# Patient Record
Sex: MALE | Race: ASIAN | URBAN - METROPOLITAN AREA
[De-identification: names, ages, dates, MRNs, and addresses within clinical notes are randomized per-mention and may not be internally consistent; named-entity substitution may affect disease eponyms.]

---

## 2024-10-27 ENCOUNTER — TELEPHONE ENCOUNTER (OUTPATIENT)
Dept: URBAN - METROPOLITAN AREA CLINIC 86 | Facility: CLINIC | Age: 39
End: 2024-10-27

## 2024-10-27 NOTE — HPI-TODAY'S VISIT:
Patient is a 39-year-old being [place] [facility] by Dolores Linares nurse practitioner at fax #[phone] for evaluation of chronic hepatitis B. Patient is listed as having hepatitis B with a DNA level of 417,000 and is of an age of 39 years of age. Some records were received from them from October 9, 2024 we did not receive other records. We need to call them and get these other records sent and if possible to see what his DNA trend has been as well as what is his liver concerns doing we can reassess from there accordingly. Discussed with patient hepatitis B treatment criteria looking DNA is over 2000 and your ALT is abnormal which for a man will be over 35 then he would be considered a candidate for treatment.  Or if his ultrasound showed signs of fibrosis that would be a concern as well. Stressed the importance of tumor screening every 6 months and following the labs for trends and see if there is any reasons for the hep B to have been as elevated as it was. Discussed hepatitis B medicines that available to control the disease but do not routinely cure the disease with only low cure rates and reported in the literature. 1.  Chronic hepatitis B noted with level seen of 417,000 which is certainly concerning for high risk to have active hepatitis B but need to see what the ALT is and if its above 35 would be treatment criteria or if there are signs of fibrosis noted.  We did see if there is any mitigate her status to why the level was high such as recent steroids or other immune suppression that could be playing a role.  Needs to have a hepatitis delta done to screen for other issues and needs tumor screening every 6 months to be stressed.  Patient education materials about hep B and we will repeat the labs and confirm these issues and try to obtain any more records we can from the [facility] [place]. 2.  Vaccine counseling to be checked for hep A and if negative consider vaccination. 3.  Liver imaging to be done every 6 months to assess status of patient for tumor risk.  Patient men over the age of 40 have the highest risk and no studies for development of hepatitis B related tumor so very important to be monitoring this patient for same. Plan 1.  Obtain missing records. 2.  Get liver imaging if not ready time. 3.  Educated patient regarding hepatitis B. 4.  Reassess post above. Duration of visit was minutes total with minutes spent preparing chart, and loading information into ECW with additional minutes spent for this face to face/TeleMed visit with time spent reviewing patient's chart, notes, labs and discussing treatment plan with time spent discussing plans with pt.

## 2024-10-28 ENCOUNTER — TELEPHONE ENCOUNTER (OUTPATIENT)
Dept: URBAN - METROPOLITAN AREA CLINIC 86 | Facility: CLINIC | Age: 39
End: 2024-10-28

## 2024-10-29 ENCOUNTER — LAB OUTSIDE AN ENCOUNTER (OUTPATIENT)
Dept: URBAN - METROPOLITAN AREA CLINIC 86 | Facility: CLINIC | Age: 39
End: 2024-10-29

## 2024-10-29 ENCOUNTER — DASHBOARD ENCOUNTERS (OUTPATIENT)
Age: 39
End: 2024-10-29

## 2024-10-29 ENCOUNTER — OFFICE VISIT (OUTPATIENT)
Dept: URBAN - METROPOLITAN AREA CLINIC 86 | Facility: CLINIC | Age: 39
End: 2024-10-29
Payer: COMMERCIAL

## 2024-10-29 VITALS
HEART RATE: 76 BPM | DIASTOLIC BLOOD PRESSURE: 77 MMHG | HEIGHT: 63 IN | TEMPERATURE: 97.2 F | WEIGHT: 147 LBS | SYSTOLIC BLOOD PRESSURE: 114 MMHG | BODY MASS INDEX: 26.05 KG/M2

## 2024-10-29 DIAGNOSIS — Z71.89 VACCINE COUNSELING: ICD-10-CM

## 2024-10-29 DIAGNOSIS — B18.1 CHRONIC HEPATITIS B: ICD-10-CM

## 2024-10-29 DIAGNOSIS — Z79.899 HIGH RISK MEDICATION USE: ICD-10-CM

## 2024-10-29 DIAGNOSIS — Z12.9 CANCER SCREENING: ICD-10-CM

## 2024-10-29 PROCEDURE — 99245 OFF/OP CONSLTJ NEW/EST HI 55: CPT

## 2024-10-29 RX ORDER — TENOFOVIR DISPROXIL FUMARATE 300 MG/1
1 TABLET TABLET ORAL ONCE A DAY
Qty: 30 | Refills: 2 | OUTPATIENT
Start: 2024-10-29

## 2024-10-29 NOTE — EXAM-PHYSICAL EXAM
Gen: awake and responsive.   Eyes: anicteric, normal lids.   Mouth: Normal lips.     Nose: No drainage.   Hearing: intact grossly.   Neck: trachea midline and no jvd.   CV: RRR no s3.   Lungs: clear. No wheezes,   Abd: Soft, nabs, NR, NT. No  liver or spleen enlargement.   Ext: No significant leg edema, slight palm erythema.   Neuro: moves all 4 ext grossly.  Responsive and interactive.

## 2024-10-29 NOTE — HPI-TODAY'S VISIT:
Patient is a 39-year-old being referred in from [facility] by Dolores Linares nurse practitioner at fax #[phone] for evaluation of chronic hepatitis B.  A copy of the note will be sent to the referring provider.  Patient is listed as having hepatitis B with a DNA level of 417,000 and is of an age of 39 years of age.  Some records were received from them from 2024 we did not receive other records.  2024 wbc 7.62 and hg 14.7 and hct 43.8 and mcv 87.8 and platelete 268 and neutrophils 4.6 and lympjs 2.3 and glu 85 and bun 13 and cr 0.82 and na 139 and k 4.2 and cl 100 and co2 31 and ca 9.3 and alb 4.2 and tb 0.5 and alk 47 ast 34 and alt 53. Hiv neg and chol 181 and hdl 53 and trg 112 and ldl 107.  chlamydia neg.  [facility] records: Pt aware of hep b since  and seen by them  (he started ast year).  No labs from [place] here and tx tdf 3-4 yrs ago and stopped a year ago in [place] and when he came here he did not take when he came here. He had left medicine in [place] and asked family to send this and asked provider re the medicine and told should do tests and then take the medicine.  He is not sure how long to take the medicine.  Wanted to know if restart tx.  All NKDA  Meds: none.  PMH: HBV  FH: Father had  with hx of liver cancer and hepatitis B. He does not know if mother ever had it. He recalls mother was negative for active hepatitis B. He asked her to take vaccine for this.  SH: some social alcohol.  BMI 24.2 and wt 147.  Discussed with patient hepatitis B treatment criteria looking DNA is over 2000 and your ALT is abnormal which for a man will be over 35 then he would be considered a candidate for treatment and he meets criteria and so needs to start this. Will start on the tdf 300mg po qd and need to watch his labs for renal and bone issues and if any change then consider the TAF version.  Need an u.s to look for liver status every 6m.  Also needs ultrasound checked for signs of fibrosis that would be a concern as well.  Stressed the importance of tumor screening every 6 months and following the labs for trends and see if there is any reasons for the hep B to have been as elevated as it was.  Discussed hepatitis B medicines that available to control the disease but do not routinely cure the disease with only low cure rates and reported in the literature. 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  Plan  1. Hep b eag. Hepatitis Delta test. 2. Start TDF 300mg po qd. 3. Need labs in 4 weeks and 8 weeks CBC and cmp and HBV dna and to see us then, 4. See us in 8 weeks. 5 If any renal issues change to TAF as less renal and bone potential risk. 6. Needs u.s. also now and every 6m.    Duration of visit was 80 minutes total with 40 minutes spent preparing chart, and loading information into ECW with additional 40 minutes spent for this face to face visit with time spent reviewing patient's chart, notes, labs and discussing treatment plan with time spent discussing plans with pt.

## 2024-11-05 ENCOUNTER — OFFICE VISIT (OUTPATIENT)
Dept: URBAN - METROPOLITAN AREA CLINIC 91 | Facility: CLINIC | Age: 39
End: 2024-11-05
Payer: COMMERCIAL

## 2024-11-05 DIAGNOSIS — R93.2 ABNORMAL ULTRASOUND OF LIVER: ICD-10-CM

## 2024-11-05 PROCEDURE — 93975 VASCULAR STUDY: CPT

## 2024-11-05 PROCEDURE — 76705 ECHO EXAM OF ABDOMEN: CPT

## 2024-11-06 ENCOUNTER — TELEPHONE ENCOUNTER (OUTPATIENT)
Dept: URBAN - METROPOLITAN AREA CLINIC 86 | Facility: CLINIC | Age: 39
End: 2024-11-06

## 2024-11-06 NOTE — HPI-TODAY'S VISIT:
Dear Gustavo Mcgovern,   Nov 5 u.s shows pancreas not well seen due to bowel gas and body habitus. Very limited assessment unremarkable. Liver was homogeneous in echotexture and no discrete liver lesions. Liver vessels patent. Gallbladder unremarkable and no gallstones. Common bile duct normal 4mm. Right kidney 10.2cm and no hydronephrosis. Spleen 8.7cm and vessels patent.   In summary, liver even in echotexture and no discrete liver lesions.  Important to do every 6m. Dr Vyas

## 2024-11-18 ENCOUNTER — LAB OUTSIDE AN ENCOUNTER (OUTPATIENT)
Dept: URBAN - METROPOLITAN AREA CLINIC 86 | Facility: CLINIC | Age: 39
End: 2024-11-18

## 2024-12-16 ENCOUNTER — LAB OUTSIDE AN ENCOUNTER (OUTPATIENT)
Dept: URBAN - METROPOLITAN AREA CLINIC 86 | Facility: CLINIC | Age: 39
End: 2024-12-16

## 2025-01-14 ENCOUNTER — OFFICE VISIT (OUTPATIENT)
Dept: URBAN - METROPOLITAN AREA CLINIC 86 | Facility: CLINIC | Age: 40
End: 2025-01-14
Payer: COMMERCIAL

## 2025-01-14 VITALS — BODY MASS INDEX: 26.4 KG/M2 | WEIGHT: 149 LBS | HEIGHT: 63 IN

## 2025-01-14 DIAGNOSIS — Z71.89 VACCINE COUNSELING: ICD-10-CM

## 2025-01-14 DIAGNOSIS — Z79.899 HIGH RISK MEDICATION USE: ICD-10-CM

## 2025-01-14 DIAGNOSIS — B18.1 CHRONIC HEPATITIS B: ICD-10-CM

## 2025-01-14 DIAGNOSIS — Z12.9 CANCER SCREENING: ICD-10-CM

## 2025-01-14 PROCEDURE — 99215 OFFICE O/P EST HI 40 MIN: CPT

## 2025-01-14 RX ORDER — TENOFOVIR ALAFENAMIDE 25 MG/1
1 TABLET WITH FOOD TABLET ORAL ONCE A DAY
Status: ACTIVE | COMMUNITY

## 2025-01-14 RX ORDER — TENOFOVIR DISPROXIL FUMARATE 300 MG/1
1 TABLET TABLET ORAL ONCE A DAY
Qty: 30 | Refills: 2 | Status: DISCONTINUED | COMMUNITY
Start: 2024-10-29

## 2025-03-01 ENCOUNTER — LAB OUTSIDE AN ENCOUNTER (OUTPATIENT)
Dept: URBAN - METROPOLITAN AREA CLINIC 86 | Facility: CLINIC | Age: 40
End: 2025-03-01

## 2025-05-29 ENCOUNTER — OFFICE VISIT (OUTPATIENT)
Dept: URBAN - METROPOLITAN AREA CLINIC 86 | Facility: CLINIC | Age: 40
End: 2025-05-29